# Patient Record
Sex: FEMALE | Race: BLACK OR AFRICAN AMERICAN | NOT HISPANIC OR LATINO | URBAN - METROPOLITAN AREA
[De-identification: names, ages, dates, MRNs, and addresses within clinical notes are randomized per-mention and may not be internally consistent; named-entity substitution may affect disease eponyms.]

---

## 2018-03-25 ENCOUNTER — EMERGENCY (EMERGENCY)
Facility: HOSPITAL | Age: 73
LOS: 1 days | Discharge: HOME | End: 2018-03-25
Attending: EMERGENCY MEDICINE | Admitting: EMERGENCY MEDICINE
Payer: MEDICARE

## 2018-03-25 VITALS
SYSTOLIC BLOOD PRESSURE: 153 MMHG | HEART RATE: 87 BPM | OXYGEN SATURATION: 100 % | RESPIRATION RATE: 20 BRPM | TEMPERATURE: 98 F | DIASTOLIC BLOOD PRESSURE: 66 MMHG

## 2018-03-25 VITALS
HEART RATE: 74 BPM | RESPIRATION RATE: 18 BRPM | OXYGEN SATURATION: 100 % | SYSTOLIC BLOOD PRESSURE: 142 MMHG | DIASTOLIC BLOOD PRESSURE: 83 MMHG

## 2018-03-25 LAB
ALBUMIN SERPL ELPH-MCNC: 4.2 G/DL — SIGNIFICANT CHANGE UP (ref 3.3–5)
ALP SERPL-CCNC: 85 U/L — SIGNIFICANT CHANGE UP (ref 40–120)
ALT FLD-CCNC: 14 U/L — SIGNIFICANT CHANGE UP (ref 4–33)
APPEARANCE UR: CLEAR — SIGNIFICANT CHANGE UP
AST SERPL-CCNC: 23 U/L — SIGNIFICANT CHANGE UP (ref 4–32)
BASOPHILS # BLD AUTO: 0.03 K/UL — SIGNIFICANT CHANGE UP (ref 0–0.2)
BASOPHILS NFR BLD AUTO: 0.6 % — SIGNIFICANT CHANGE UP (ref 0–2)
BILIRUB SERPL-MCNC: 0.9 MG/DL — SIGNIFICANT CHANGE UP (ref 0.2–1.2)
BILIRUB UR-MCNC: NEGATIVE — SIGNIFICANT CHANGE UP
BLOOD UR QL VISUAL: NEGATIVE — SIGNIFICANT CHANGE UP
BUN SERPL-MCNC: 11 MG/DL — SIGNIFICANT CHANGE UP (ref 7–23)
CALCIUM SERPL-MCNC: 9.4 MG/DL — SIGNIFICANT CHANGE UP (ref 8.4–10.5)
CHLORIDE SERPL-SCNC: 92 MMOL/L — LOW (ref 98–107)
CO2 SERPL-SCNC: 25 MMOL/L — SIGNIFICANT CHANGE UP (ref 22–31)
COLOR SPEC: YELLOW — SIGNIFICANT CHANGE UP
CREAT SERPL-MCNC: 0.74 MG/DL — SIGNIFICANT CHANGE UP (ref 0.5–1.3)
CRP SERPL-MCNC: 6.2 MG/L — HIGH
EOSINOPHIL # BLD AUTO: 0.01 K/UL — SIGNIFICANT CHANGE UP (ref 0–0.5)
EOSINOPHIL NFR BLD AUTO: 0.2 % — SIGNIFICANT CHANGE UP (ref 0–6)
GLUCOSE SERPL-MCNC: 114 MG/DL — HIGH (ref 70–99)
GLUCOSE UR-MCNC: NEGATIVE — SIGNIFICANT CHANGE UP
HCT VFR BLD CALC: 39 % — SIGNIFICANT CHANGE UP (ref 34.5–45)
HGB BLD-MCNC: 13.1 G/DL — SIGNIFICANT CHANGE UP (ref 11.5–15.5)
IMM GRANULOCYTES # BLD AUTO: 0.02 # — SIGNIFICANT CHANGE UP
IMM GRANULOCYTES NFR BLD AUTO: 0.4 % — SIGNIFICANT CHANGE UP (ref 0–1.5)
KETONES UR-MCNC: NEGATIVE — SIGNIFICANT CHANGE UP
LEUKOCYTE ESTERASE UR-ACNC: NEGATIVE — SIGNIFICANT CHANGE UP
LYMPHOCYTES # BLD AUTO: 1.49 K/UL — SIGNIFICANT CHANGE UP (ref 1–3.3)
LYMPHOCYTES # BLD AUTO: 27.3 % — SIGNIFICANT CHANGE UP (ref 13–44)
MCHC RBC-ENTMCNC: 30 PG — SIGNIFICANT CHANGE UP (ref 27–34)
MCHC RBC-ENTMCNC: 33.6 % — SIGNIFICANT CHANGE UP (ref 32–36)
MCV RBC AUTO: 89.2 FL — SIGNIFICANT CHANGE UP (ref 80–100)
MONOCYTES # BLD AUTO: 0.74 K/UL — SIGNIFICANT CHANGE UP (ref 0–0.9)
MONOCYTES NFR BLD AUTO: 13.6 % — SIGNIFICANT CHANGE UP (ref 2–14)
MUCOUS THREADS # UR AUTO: SIGNIFICANT CHANGE UP
NEUTROPHILS # BLD AUTO: 3.16 K/UL — SIGNIFICANT CHANGE UP (ref 1.8–7.4)
NEUTROPHILS NFR BLD AUTO: 57.9 % — SIGNIFICANT CHANGE UP (ref 43–77)
NITRITE UR-MCNC: NEGATIVE — SIGNIFICANT CHANGE UP
NRBC # FLD: 0 — SIGNIFICANT CHANGE UP
PH UR: 7 — SIGNIFICANT CHANGE UP (ref 4.6–8)
PLATELET # BLD AUTO: 243 K/UL — SIGNIFICANT CHANGE UP (ref 150–400)
PMV BLD: 9.5 FL — SIGNIFICANT CHANGE UP (ref 7–13)
POTASSIUM SERPL-MCNC: 4.1 MMOL/L — SIGNIFICANT CHANGE UP (ref 3.5–5.3)
POTASSIUM SERPL-SCNC: 4.1 MMOL/L — SIGNIFICANT CHANGE UP (ref 3.5–5.3)
PROT SERPL-MCNC: 8.1 G/DL — SIGNIFICANT CHANGE UP (ref 6–8.3)
PROT UR-MCNC: 20 MG/DL — SIGNIFICANT CHANGE UP
RBC # BLD: 4.37 M/UL — SIGNIFICANT CHANGE UP (ref 3.8–5.2)
RBC # FLD: 12.8 % — SIGNIFICANT CHANGE UP (ref 10.3–14.5)
RBC CASTS # UR COMP ASSIST: SIGNIFICANT CHANGE UP (ref 0–?)
SODIUM SERPL-SCNC: 131 MMOL/L — LOW (ref 135–145)
SP GR SPEC: 1.01 — SIGNIFICANT CHANGE UP (ref 1–1.04)
SQUAMOUS # UR AUTO: SIGNIFICANT CHANGE UP
TSH SERPL-MCNC: 1.63 UIU/ML — SIGNIFICANT CHANGE UP (ref 0.27–4.2)
UROBILINOGEN FLD QL: NORMAL MG/DL — SIGNIFICANT CHANGE UP
WBC # BLD: 5.45 K/UL — SIGNIFICANT CHANGE UP (ref 3.8–10.5)
WBC # FLD AUTO: 5.45 K/UL — SIGNIFICANT CHANGE UP (ref 3.8–10.5)
WBC UR QL: SIGNIFICANT CHANGE UP (ref 0–?)

## 2018-03-25 PROCEDURE — 71046 X-RAY EXAM CHEST 2 VIEWS: CPT | Mod: 26

## 2018-03-25 PROCEDURE — 99284 EMERGENCY DEPT VISIT MOD MDM: CPT | Mod: GC

## 2018-03-25 NOTE — ED ADULT NURSE NOTE - OBJECTIVE STATEMENT
pt A&Ox3 c/o feeling faint and hot inside that began last week, pt states she was recently placed on Lopressor two weeks ago, denies n/v/cp, sob, respirations equal and non labored, nsr on monitor, MD Mercado at bedside

## 2018-03-25 NOTE — ED PROVIDER NOTE - PROGRESS NOTE DETAILS
Yg Mercado MD PGY4: Labs, imaging reviewed. Will discharge with instructions for outpatient workup and follow-up.

## 2018-03-25 NOTE — ED PROVIDER NOTE - CARE PLAN
Principal Discharge DX:	Fatigue  Assessment and plan of treatment:	Call your primary care doctor today or tomorrow to schedule follow up appointment for within the next 3-5 days.  Continue taking your medications as prescribed.  Return to this Emergency Department if you experience worsening condition or for any other emergency.

## 2018-03-25 NOTE — ED PROVIDER NOTE - MEDICAL DECISION MAKING DETAILS
74 yo woman here w/ ?fever, feeling unwell for >3 wks. Non localizing history, non localizing examination. Unknown etiology Will check labs, cxr, ua, ekg.

## 2018-03-25 NOTE — ED PROVIDER NOTE - OBJECTIVE STATEMENT
72 yo woman w/ h/o htn p/w feeling ill. States for the past 3 wks she has been feeling unwell. States at night she has been feeling very hot, nearly every night, though did not take her temperature. Has seen PMD for these complaints, unclear workup, but has completed a course of azithromycin last week. Today came to ED for continued complaints. Denies vomiting, diarrhea, lightheadedness/near syncope/syncope, chest pain, sob, cough, urinary symptoms.  PMD: Patrick

## 2024-06-05 ENCOUNTER — INPATIENT (INPATIENT)
Facility: HOSPITAL | Age: 79
LOS: 0 days | Discharge: HOME | End: 2024-06-06
Attending: INTERNAL MEDICINE | Admitting: INTERNAL MEDICINE
Payer: MEDICARE

## 2024-06-05 VITALS
RESPIRATION RATE: 12 BRPM | SYSTOLIC BLOOD PRESSURE: 169 MMHG | DIASTOLIC BLOOD PRESSURE: 59 MMHG | TEMPERATURE: 98 F | OXYGEN SATURATION: 99 %

## 2024-06-05 DIAGNOSIS — I25.10 ATHEROSCLEROTIC HEART DISEASE OF NATIVE CORONARY ARTERY WITHOUT ANGINA PECTORIS: ICD-10-CM

## 2024-06-05 LAB
ANION GAP SERPL CALC-SCNC: 12 MMOL/L — SIGNIFICANT CHANGE UP (ref 7–14)
BUN SERPL-MCNC: 14 MG/DL — SIGNIFICANT CHANGE UP (ref 7–23)
CALCIUM SERPL-MCNC: 9.7 MG/DL — SIGNIFICANT CHANGE UP (ref 8.4–10.5)
CHLORIDE SERPL-SCNC: 100 MMOL/L — SIGNIFICANT CHANGE UP (ref 98–107)
CO2 SERPL-SCNC: 22 MMOL/L — SIGNIFICANT CHANGE UP (ref 22–31)
CREAT SERPL-MCNC: 0.68 MG/DL — SIGNIFICANT CHANGE UP (ref 0.5–1.3)
EGFR: 88 ML/MIN/1.73M2 — SIGNIFICANT CHANGE UP
GLUCOSE SERPL-MCNC: 105 MG/DL — HIGH (ref 70–99)
HCT VFR BLD CALC: 35.8 % — SIGNIFICANT CHANGE UP (ref 34.5–45)
HGB BLD-MCNC: 12.1 G/DL — SIGNIFICANT CHANGE UP (ref 11.5–15.5)
MCHC RBC-ENTMCNC: 30.9 PG — SIGNIFICANT CHANGE UP (ref 27–34)
MCHC RBC-ENTMCNC: 33.8 GM/DL — SIGNIFICANT CHANGE UP (ref 32–36)
MCV RBC AUTO: 91.3 FL — SIGNIFICANT CHANGE UP (ref 80–100)
NRBC # BLD: 0 /100 WBCS — SIGNIFICANT CHANGE UP (ref 0–0)
NRBC # FLD: 0.03 K/UL — HIGH (ref 0–0)
PLATELET # BLD AUTO: 145 K/UL — LOW (ref 150–400)
POTASSIUM SERPL-MCNC: 5.1 MMOL/L — SIGNIFICANT CHANGE UP (ref 3.5–5.3)
POTASSIUM SERPL-SCNC: 5.1 MMOL/L — SIGNIFICANT CHANGE UP (ref 3.5–5.3)
RBC # BLD: 3.92 M/UL — SIGNIFICANT CHANGE UP (ref 3.8–5.2)
RBC # FLD: 13.3 % — SIGNIFICANT CHANGE UP (ref 10.3–14.5)
SODIUM SERPL-SCNC: 134 MMOL/L — LOW (ref 135–145)
WBC # BLD: 4.67 K/UL — SIGNIFICANT CHANGE UP (ref 3.8–10.5)
WBC # FLD AUTO: 4.67 K/UL — SIGNIFICANT CHANGE UP (ref 3.8–10.5)

## 2024-06-05 PROCEDURE — 99152 MOD SED SAME PHYS/QHP 5/>YRS: CPT

## 2024-06-05 PROCEDURE — 93458 L HRT ARTERY/VENTRICLE ANGIO: CPT | Mod: 26,59

## 2024-06-05 PROCEDURE — 93010 ELECTROCARDIOGRAM REPORT: CPT

## 2024-06-05 PROCEDURE — 92928 PRQ TCAT PLMT NTRAC ST 1 LES: CPT | Mod: 26,RC

## 2024-06-05 PROCEDURE — 92978 ENDOLUMINL IVUS OCT C 1ST: CPT | Mod: 26,RC

## 2024-06-05 RX ORDER — SODIUM CHLORIDE 9 MG/ML
250 INJECTION INTRAMUSCULAR; INTRAVENOUS; SUBCUTANEOUS ONCE
Refills: 0 | Status: COMPLETED | OUTPATIENT
Start: 2024-06-05 | End: 2024-06-05

## 2024-06-05 RX ORDER — ASPIRIN/CALCIUM CARB/MAGNESIUM 324 MG
81 TABLET ORAL DAILY
Refills: 0 | Status: DISCONTINUED | OUTPATIENT
Start: 2024-06-06 | End: 2024-06-06

## 2024-06-05 RX ORDER — SODIUM CHLORIDE 9 MG/ML
500 INJECTION INTRAMUSCULAR; INTRAVENOUS; SUBCUTANEOUS
Refills: 0 | Status: DISCONTINUED | OUTPATIENT
Start: 2024-06-05 | End: 2024-06-06

## 2024-06-05 RX ORDER — PANTOPRAZOLE SODIUM 20 MG/1
40 TABLET, DELAYED RELEASE ORAL
Refills: 0 | Status: DISCONTINUED | OUTPATIENT
Start: 2024-06-06 | End: 2024-06-06

## 2024-06-05 RX ORDER — METOPROLOL TARTRATE 50 MG
1 TABLET ORAL
Refills: 0 | DISCHARGE

## 2024-06-05 RX ORDER — CLOPIDOGREL BISULFATE 75 MG/1
75 TABLET, FILM COATED ORAL DAILY
Refills: 0 | Status: DISCONTINUED | OUTPATIENT
Start: 2024-06-06 | End: 2024-06-06

## 2024-06-05 RX ORDER — SODIUM CHLORIDE 9 MG/ML
3 INJECTION INTRAMUSCULAR; INTRAVENOUS; SUBCUTANEOUS EVERY 8 HOURS
Refills: 0 | Status: DISCONTINUED | OUTPATIENT
Start: 2024-06-05 | End: 2024-06-06

## 2024-06-05 RX ORDER — HEPARIN SODIUM 5000 [USP'U]/ML
5000 INJECTION INTRAVENOUS; SUBCUTANEOUS EVERY 12 HOURS
Refills: 0 | Status: DISCONTINUED | OUTPATIENT
Start: 2024-06-06 | End: 2024-06-06

## 2024-06-05 RX ORDER — METOPROLOL TARTRATE 50 MG
50 TABLET ORAL DAILY
Refills: 0 | Status: DISCONTINUED | OUTPATIENT
Start: 2024-06-05 | End: 2024-06-06

## 2024-06-05 RX ORDER — ATORVASTATIN CALCIUM 80 MG/1
80 TABLET, FILM COATED ORAL AT BEDTIME
Refills: 0 | Status: DISCONTINUED | OUTPATIENT
Start: 2024-06-06 | End: 2024-06-06

## 2024-06-05 RX ORDER — ESOMEPRAZOLE MAGNESIUM 40 MG/1
1 CAPSULE, DELAYED RELEASE ORAL
Refills: 0 | DISCHARGE

## 2024-06-05 RX ORDER — SODIUM CHLORIDE 9 MG/ML
1000 INJECTION INTRAMUSCULAR; INTRAVENOUS; SUBCUTANEOUS
Refills: 0 | Status: DISCONTINUED | OUTPATIENT
Start: 2024-06-05 | End: 2024-06-05

## 2024-06-05 RX ADMIN — SODIUM CHLORIDE 3 MILLILITER(S): 9 INJECTION INTRAMUSCULAR; INTRAVENOUS; SUBCUTANEOUS at 21:50

## 2024-06-05 RX ADMIN — SODIUM CHLORIDE 75 MILLILITER(S): 9 INJECTION INTRAMUSCULAR; INTRAVENOUS; SUBCUTANEOUS at 23:01

## 2024-06-05 RX ADMIN — SODIUM CHLORIDE 75 MILLILITER(S): 9 INJECTION INTRAMUSCULAR; INTRAVENOUS; SUBCUTANEOUS at 13:07

## 2024-06-05 RX ADMIN — SODIUM CHLORIDE 75 MILLILITER(S): 9 INJECTION INTRAMUSCULAR; INTRAVENOUS; SUBCUTANEOUS at 15:55

## 2024-06-05 RX ADMIN — SODIUM CHLORIDE 1000 MILLILITER(S): 9 INJECTION INTRAMUSCULAR; INTRAVENOUS; SUBCUTANEOUS at 13:07

## 2024-06-05 NOTE — CHART NOTE - NSCHARTNOTEFT_GEN_A_CORE
79yFemale s/p cardiac cath today for right radial site check. Pt without any complaints at this time.    Right radial site: Dressing in place c/d/i. No hematoma. No swelling/edema/discoloration/coldness of extremity. Pulses and sensation intact. Cap refill <3 secs.     Will cont to monitor.

## 2024-06-05 NOTE — H&P CARDIOLOGY - NSICDXPASTMEDICALHX_GEN_ALL_CORE_FT
PAST MEDICAL HISTORY:  HLD (hyperlipidemia)      PAST MEDICAL HISTORY:  GERD (gastroesophageal reflux disease)     HLD (hyperlipidemia)

## 2024-06-05 NOTE — ASU PATIENT PROFILE, ADULT - HISTORY OF COVID-19 VACCINATION
----- Message from Chelsea Dennis MD sent at 9/9/2022  2:32 PM EDT -----  Regarding: Please inform patient of acceptable stress test result  Please notify the patient that their stress test was acceptable and reassuring  It did show very high blood pressure so please remind her to follow up as we discussed per my last note  No other cardiac workup now  Thanks!    - Israel Jaramillo    ________________    ·  Stress ECG: The patient reached stage 2 0 of the protocol after exercising for 4 min and 52 sec and had a maximal HR of 144 bpm (92 % of MPHR) and 6 8 METS  The test was stopped because of hypertension  Blood pressure demonstrated a hypertensive response and heart rate demonstrated a normal response to stress  ·  Stress ECG: Up sloping ST depression of 1 0 mm in the inferior leads is noted  ST depression began during stress and ended during recovery  There were no arrhythmias during stress  The stress ECG is equivocal for ischemia after maximal exercise, without reproduction of symptoms  Hypertensive blood pressure response  No clinical angina reported    ·  Knight Treadmill Score =0 (Moderate Risk)
Called and advised 
Yes

## 2024-06-05 NOTE — H&P CARDIOLOGY - COMMENTS
Pre Procedural Sedation Evaluation    Urine pregnancy: N/A  Dentures: None  Last PO intake: NPO p MN on 6/4/2024  Obstructive sleep apnea: No  Aspiration risk: No  Mallampati score: 2  ASA Classification: 2  Prior Sedative or Anesthesia Experience: No complications  Informed consent by responsible adult: Yes  Responsible adult escort: Yes  Based on today's assessment, anesthesia consult requested: No

## 2024-06-05 NOTE — H&P CARDIOLOGY - HISTORY OF PRESENT ILLNESS
80 y/o F w/ PMH of HLD and Pre-DM2 presents for cardiac catheretization.    Stress test from 4/24/2024 was abnormal with showing a large moderate-severe infero basal-apical partially reversible ischemia, inferior + basal inferior hypokinesis and normal LVEF of 66%. Echocardiogram from 3/12/2024 was also abnormal with basal inferolateral + mid inferolateral hypokinesis, normal LVEF of 67% and moderate MR.    Refferring MD: Dr. Aparicio 78 y/o F w/ PMH of HLD, GERD and Pre-DM2 presents for cardiac catheretization. Pt complains of exertional shortness breath which has been occurring for the past several months. Pt first noticed the PAIZ when chasing after a bus. Stress test from 4/24/2024 was abnormal with showing a large moderate-severe infero basal-apical partially reversible ischemia, inferior + basal inferior hypokinesis and normal LVEF of 66%. Echocardiogram from 3/12/2024 was also abnormal with basal inferolateral + mid inferolateral hypokinesis, normal LVEF of 67% and moderate MR. Pt also has a family Hx of CAD with her sister having 2 stents.    Refferring MD: Dr. Aparicoi

## 2024-06-06 VITALS
OXYGEN SATURATION: 99 % | DIASTOLIC BLOOD PRESSURE: 76 MMHG | SYSTOLIC BLOOD PRESSURE: 129 MMHG | TEMPERATURE: 98 F | HEART RATE: 75 BPM | RESPIRATION RATE: 17 BRPM

## 2024-06-06 LAB
ANION GAP SERPL CALC-SCNC: 12 MMOL/L — SIGNIFICANT CHANGE UP (ref 7–14)
BUN SERPL-MCNC: 14 MG/DL — SIGNIFICANT CHANGE UP (ref 7–23)
CALCIUM SERPL-MCNC: 9.5 MG/DL — SIGNIFICANT CHANGE UP (ref 8.4–10.5)
CHLORIDE SERPL-SCNC: 103 MMOL/L — SIGNIFICANT CHANGE UP (ref 98–107)
CO2 SERPL-SCNC: 22 MMOL/L — SIGNIFICANT CHANGE UP (ref 22–31)
CREAT SERPL-MCNC: 0.69 MG/DL — SIGNIFICANT CHANGE UP (ref 0.5–1.3)
EGFR: 88 ML/MIN/1.73M2 — SIGNIFICANT CHANGE UP
GLUCOSE SERPL-MCNC: 94 MG/DL — SIGNIFICANT CHANGE UP (ref 70–99)
HCT VFR BLD CALC: 36.1 % — SIGNIFICANT CHANGE UP (ref 34.5–45)
HGB BLD-MCNC: 11.9 G/DL — SIGNIFICANT CHANGE UP (ref 11.5–15.5)
MAGNESIUM SERPL-MCNC: 1.9 MG/DL — SIGNIFICANT CHANGE UP (ref 1.6–2.6)
MCHC RBC-ENTMCNC: 30.2 PG — SIGNIFICANT CHANGE UP (ref 27–34)
MCHC RBC-ENTMCNC: 33 GM/DL — SIGNIFICANT CHANGE UP (ref 32–36)
MCV RBC AUTO: 91.6 FL — SIGNIFICANT CHANGE UP (ref 80–100)
NRBC # BLD: 0 /100 WBCS — SIGNIFICANT CHANGE UP (ref 0–0)
NRBC # FLD: 0 K/UL — SIGNIFICANT CHANGE UP (ref 0–0)
PHOSPHATE SERPL-MCNC: 2.9 MG/DL — SIGNIFICANT CHANGE UP (ref 2.5–4.5)
PLATELET # BLD AUTO: 171 K/UL — SIGNIFICANT CHANGE UP (ref 150–400)
POTASSIUM SERPL-MCNC: 3.8 MMOL/L — SIGNIFICANT CHANGE UP (ref 3.5–5.3)
POTASSIUM SERPL-SCNC: 3.8 MMOL/L — SIGNIFICANT CHANGE UP (ref 3.5–5.3)
RBC # BLD: 3.94 M/UL — SIGNIFICANT CHANGE UP (ref 3.8–5.2)
RBC # FLD: 13.2 % — SIGNIFICANT CHANGE UP (ref 10.3–14.5)
SODIUM SERPL-SCNC: 137 MMOL/L — SIGNIFICANT CHANGE UP (ref 135–145)
WBC # BLD: 4.54 K/UL — SIGNIFICANT CHANGE UP (ref 3.8–10.5)
WBC # FLD AUTO: 4.54 K/UL — SIGNIFICANT CHANGE UP (ref 3.8–10.5)

## 2024-06-06 PROCEDURE — 99232 SBSQ HOSP IP/OBS MODERATE 35: CPT

## 2024-06-06 RX ORDER — CLOPIDOGREL BISULFATE 75 MG/1
1 TABLET, FILM COATED ORAL
Qty: 30 | Refills: 2
Start: 2024-06-06 | End: 2024-09-03

## 2024-06-06 RX ORDER — ROSUVASTATIN CALCIUM 5 MG/1
1 TABLET ORAL
Qty: 30 | Refills: 0
Start: 2024-06-06 | End: 2024-07-05

## 2024-06-06 RX ORDER — PITAVASTATIN CALCIUM 1.04 MG/1
1 TABLET, FILM COATED ORAL
Qty: 30 | Refills: 0
Start: 2024-06-06 | End: 2024-07-05

## 2024-06-06 RX ORDER — ASPIRIN/CALCIUM CARB/MAGNESIUM 324 MG
1 TABLET ORAL
Qty: 30 | Refills: 2
Start: 2024-06-06 | End: 2024-09-03

## 2024-06-06 RX ADMIN — HEPARIN SODIUM 5000 UNIT(S): 5000 INJECTION INTRAVENOUS; SUBCUTANEOUS at 05:29

## 2024-06-06 RX ADMIN — Medication 81 MILLIGRAM(S): at 12:04

## 2024-06-06 RX ADMIN — Medication 50 MILLIGRAM(S): at 05:28

## 2024-06-06 RX ADMIN — CLOPIDOGREL BISULFATE 75 MILLIGRAM(S): 75 TABLET, FILM COATED ORAL at 12:04

## 2024-06-06 RX ADMIN — SODIUM CHLORIDE 3 MILLILITER(S): 9 INJECTION INTRAMUSCULAR; INTRAVENOUS; SUBCUTANEOUS at 06:30

## 2024-06-06 RX ADMIN — PANTOPRAZOLE SODIUM 40 MILLIGRAM(S): 20 TABLET, DELAYED RELEASE ORAL at 05:28

## 2024-06-06 NOTE — DISCHARGE NOTE PROVIDER - PROVIDER TOKENS
PROVIDER:[TOKEN:[[id number]],FOLLOWUP:[1 week]],FREE:[LAST:[Your],FIRST:[PCP],PHONE:[(   )    -],FAX:[(   )    -],FOLLOWUP:[1 week]]

## 2024-06-06 NOTE — DISCHARGE NOTE NURSING/CASE MANAGEMENT/SOCIAL WORK - PATIENT PORTAL LINK FT
You can access the FollowMyHealth Patient Portal offered by [facility] by registering at the following website: [email or web address]. By joining [employer]’s FollowMyHealth portal, you will also be able to view your health information using other applications (apps) compatible with our system.

## 2024-06-06 NOTE — PROGRESS NOTE ADULT - SUBJECTIVE AND OBJECTIVE BOX
Incomplete    Patient seen and examined at bedside.    Overnight Events:       REVIEW OF SYSTEMS:  Constitutional:     [ ] negative [ ] fevers [ ] chills [ ] weight loss [ ] weight gain  HEENT:                  [ ] negative [ ] dry eyes [ ] eye irritation [ ] postnasal drip [ ] nasal congestion  CV:                         [ ] negative  [ ] chest pain [ ] orthopnea [ ] palpitations [ ] murmur  Resp:                     [ ] negative [ ] cough [ ] shortness of breath [ ] dyspnea [ ] wheezing [ ] sputum [ ]hemoptysis  GI:                          [ ] negative [ ] nausea [ ] vomiting [ ] diarrhea [ ] constipation [ ] abd pain [ ] dysphagia   :                        [ ] negative [ ] dysuria [ ] nocturia [ ] hematuria [ ] increased urinary frequency  Musculoskeletal: [ ] negative [ ] back pain [ ] myalgias [ ] arthralgias [ ] fracture  Skin:                       [ ] negative [ ] rash [ ] itch  Neurological:        [ ] negative [ ] headache [ ] dizziness [ ] syncope [ ] weakness [ ] numbness  Psychiatric:           [ ] negative [ ] anxiety [ ] depression  Endocrine:            [ ] negative [ ] diabetes [ ] thyroid problem  Heme/Lymph:      [ ] negative [ ] anemia [ ] bleeding problem  Allergic/Immune: [ ] negative [ ] itchy eyes [ ] nasal discharge [ ] hives [ ] angioedema    [ ] All other systems negative  [ ] Unable to assess ROS due to    Current Meds:  aspirin enteric coated 81 milliGRAM(s) Oral daily  atorvastatin 80 milliGRAM(s) Oral at bedtime  clopidogrel Tablet 75 milliGRAM(s) Oral daily  heparin   Injectable 5000 Unit(s) SubCutaneous every 12 hours  metoprolol succinate ER 50 milliGRAM(s) Oral daily  pantoprazole    Tablet 40 milliGRAM(s) Oral before breakfast  sodium chloride 0.9% lock flush 3 milliLiter(s) IV Push every 8 hours  sodium chloride 0.9%. 500 milliLiter(s) IV Continuous <Continuous>  sodium chloride 0.9%. 500 milliLiter(s) IV Continuous <Continuous>      PAST MEDICAL & SURGICAL HISTORY:  HLD (hyperlipidemia)      GERD (gastroesophageal reflux disease)    Vitals:  T(F): 97.6 (06-06), Max: 98.8 (06-05)  HR: 67 (06-06) (63 - 73)  BP: 146/67 (06-06) (138/53 - 170/67)  RR: 17 (06-06)  SpO2: 98% (06-06)  I&O's Summary      Physical Exam:  Appearance: No acute distress; well appearing  Eyes: PERRL, EOMI, pink conjunctiva  HENT: Normal oral mucosa  Cardiovascular: RRR, S1, S2, no murmurs, rubs, or gallops; no edema; no JVD  Respiratory: Clear to auscultation bilaterally  Gastrointestinal: soft, non-tender, non-distended with normal bowel sounds  Musculoskeletal: No clubbing; no joint deformity   Neurologic: Non-focal  Lymphatic: No lymphadenopathy  Psychiatry: AAOx3, mood & affect appropriate  Skin: No rashes, ecchymoses, or cyanosis                          11.9   4.54  )-----------( 171      ( 06 Jun 2024 07:00 )             36.1     06-06    137  |  103  |  14  ----------------------------<  94  3.8   |  22  |  0.69    Ca    9.5      06 Jun 2024 07:00  Phos  2.9     06-06  Mg     1.90     06-06             Patient seen and examined at bedside. HDS. Pt denies active CP, SOB, palpitations, diaphoresis, orthopnea, PND, dizziness, syncope, hemoptysis, hematuria, melena, abdominal, pain/discomfort, LE swelling or any other complaints at this time.    Overnight Events: none      REVIEW OF SYSTEMS:  Constitutional:     [ ] negative [ ] fevers [ ] chills [ ] weight loss [ ] weight gain  HEENT:                  [ ] negative [ ] dry eyes [ ] eye irritation [ ] postnasal drip [ ] nasal congestion  CV:                         [ ] negative  [ ] chest pain [ ] orthopnea [ ] palpitations [ ] murmur  Resp:                     [ ] negative [ ] cough [ ] shortness of breath [ ] dyspnea [ ] wheezing [ ] sputum [ ]hemoptysis  GI:                          [ ] negative [ ] nausea [ ] vomiting [ ] diarrhea [ ] constipation [ ] abd pain [ ] dysphagia   :                        [ ] negative [ ] dysuria [ ] nocturia [ ] hematuria [ ] increased urinary frequency  Musculoskeletal: [ ] negative [ ] back pain [ ] myalgias [ ] arthralgias [ ] fracture  Skin:                       [ ] negative [ ] rash [ ] itch  Neurological:        [ ] negative [ ] headache [ ] dizziness [ ] syncope [ ] weakness [ ] numbness  Psychiatric:           [ ] negative [ ] anxiety [ ] depression  Endocrine:            [ ] negative [ ] diabetes [ ] thyroid problem  Heme/Lymph:      [ ] negative [ ] anemia [ ] bleeding problem  Allergic/Immune: [ ] negative [ ] itchy eyes [ ] nasal discharge [ ] hives [ ] angioedema    [x] All other systems negative  [ ] Unable to assess ROS due to    Current Meds:  aspirin enteric coated 81 milliGRAM(s) Oral daily  atorvastatin 80 milliGRAM(s) Oral at bedtime  clopidogrel Tablet 75 milliGRAM(s) Oral daily  heparin   Injectable 5000 Unit(s) SubCutaneous every 12 hours  metoprolol succinate ER 50 milliGRAM(s) Oral daily  pantoprazole    Tablet 40 milliGRAM(s) Oral before breakfast  sodium chloride 0.9% lock flush 3 milliLiter(s) IV Push every 8 hours  sodium chloride 0.9%. 500 milliLiter(s) IV Continuous <Continuous>  sodium chloride 0.9%. 500 milliLiter(s) IV Continuous <Continuous>      PAST MEDICAL & SURGICAL HISTORY:  HLD (hyperlipidemia)      GERD (gastroesophageal reflux disease)    Vitals:  T(F): 97.6 (06-06), Max: 98.8 (06-05)  HR: 67 (06-06) (63 - 73)  BP: 146/67 (06-06) (138/53 - 170/67)  RR: 17 (06-06)  SpO2: 98% (06-06)  I&O's Summary    Physical Exam:  Appearance: No acute distress; well appearing  Neck: Supple, no JVD B/L, no Carotid Bruit B/L  Cardiovascular: RRR, S1, S2, no murmurs, rubs, or gallops, no edema  Respiratory: Clear to auscultation bilaterally, no RRW B/L  Gastrointestinal: soft, non-tender, non-distended with normal bowel sounds  Neurologic: No focal or neuro deficits noted  Psychiatry: AAOx3, mood & affect appropriate  Access Site: RRA. Stable, C/D/I, w/o hematoma or bleeding, pulses intact, back to baseline                        11.9   4.54  )-----------( 171      ( 06 Jun 2024 07:00 )             36.1     06-06    137  |  103  |  14  ----------------------------<  94  3.8   |  22  |  0.69    Ca    9.5      06 Jun 2024 07:00  Phos  2.9     06-06  Mg     1.90     06-06             Patient seen and examined at bedside. HDS. Pt denies active CP, SOB, palpitations, diaphoresis, orthopnea, PND, dizziness, syncope, hemoptysis, hematuria, melena, abdominal, pain/discomfort, LE swelling or any other complaints at this time.    Overnight Events: none      REVIEW OF SYSTEMS:  Constitutional:     [ ] negative [ ] fevers [ ] chills [ ] weight loss [ ] weight gain  HEENT:                  [ ] negative [ ] dry eyes [ ] eye irritation [ ] postnasal drip [ ] nasal congestion  CV:                         [ ] negative  [ ] chest pain [ ] orthopnea [ ] palpitations [ ] murmur  Resp:                     [ ] negative [ ] cough [ ] shortness of breath [ ] dyspnea [ ] wheezing [ ] sputum [ ]hemoptysis  GI:                          [ ] negative [ ] nausea [ ] vomiting [ ] diarrhea [ ] constipation [ ] abd pain [ ] dysphagia   :                        [ ] negative [ ] dysuria [ ] nocturia [ ] hematuria [ ] increased urinary frequency  Musculoskeletal: [ ] negative [ ] back pain [ ] myalgias [ ] arthralgias [ ] fracture  Skin:                       [ ] negative [ ] rash [ ] itch  Neurological:        [ ] negative [ ] headache [ ] dizziness [ ] syncope [ ] weakness [ ] numbness  Psychiatric:           [ ] negative [ ] anxiety [ ] depression  Endocrine:            [ ] negative [ ] diabetes [ ] thyroid problem  Heme/Lymph:      [ ] negative [ ] anemia [ ] bleeding problem  Allergic/Immune: [ ] negative [ ] itchy eyes [ ] nasal discharge [ ] hives [ ] angioedema    [x] All other systems negative  [ ] Unable to assess ROS due to    Current Meds:  aspirin enteric coated 81 milliGRAM(s) Oral daily  atorvastatin 80 milliGRAM(s) Oral at bedtime  clopidogrel Tablet 75 milliGRAM(s) Oral daily  heparin   Injectable 5000 Unit(s) SubCutaneous every 12 hours  metoprolol succinate ER 50 milliGRAM(s) Oral daily  pantoprazole    Tablet 40 milliGRAM(s) Oral before breakfast  sodium chloride 0.9% lock flush 3 milliLiter(s) IV Push every 8 hours  sodium chloride 0.9%. 500 milliLiter(s) IV Continuous <Continuous>  sodium chloride 0.9%. 500 milliLiter(s) IV Continuous <Continuous>      PAST MEDICAL & SURGICAL HISTORY:  HLD (hyperlipidemia)      GERD (gastroesophageal reflux disease)    Vitals:  T(F): 97.6 (06-06), Max: 98.8 (06-05)  HR: 67 (06-06) (63 - 73)  BP: 146/67 (06-06) (138/53 - 170/67)  RR: 17 (06-06)  SpO2: 98% (06-06)  I&O's Summary    Physical Exam:  Appearance: No acute distress; well appearing  Neck: Supple, no JVD B/L, no Carotid Bruit B/L  Cardiovascular: RRR, S1, S2, no murmurs, rubs, or gallops, no edema  Respiratory: Clear to auscultation bilaterally, no RRW B/L  Gastrointestinal: soft, non-tender, non-distended with normal bowel sounds  Neurologic: No focal or neuro deficits noted  Psychiatry: AAOx3, mood & affect appropriate  Access Site: RRA. Stable, C/D/I, w/o hematoma or bleeding, pulses intact, back to baseline                        11.9   4.54  )-----------( 171      ( 06 Jun 2024 07:00 )             36.1     06-06    137  |  103  |  14  ----------------------------<  94  3.8   |  22  |  0.69    Ca    9.5      06 Jun 2024 07:00  Phos  2.9     06-06  Mg     1.90     06-06 6/6 Telemetry: NSR

## 2024-06-06 NOTE — DISCHARGE NOTE PROVIDER - HOSPITAL COURSE
78 y/o F, FHX CAD w/ PMH of HLD, GERD and Pre-DM2 presents for cardiac catheretization. Pt complains of exertional shortness breath which has been occurring for the past several months. Pt first noticed the PAIZ when chasing after a bus. Stress test from 4/24/2024 was abnormal with showing a large moderate-severe infero basal-apical partially reversible ischemia, inferior + basal inferior hypokinesis and normal LVEF of 66%. Echocardiogram from 3/12/2024 was also abnormal with basal inferolateral + mid inferolateral hypokinesis, normal LVEF of 67% and moderate MR.  Pt underwent LHC 6/5:  mRCA 99% x1 MICHAEL, LVEDP 25mmHg.     Plan    - continue ASA 81mg daily, Plavix 75mg daily for DAPT for 1 year (please send script for DAPT, 30 day supply with 2 refills)  - started on Atorvastatin 80mg daily to prevent atherosclerotic plaque build up in stent  - continue Metoprolol Succinate 50mg daily  - continue Nexium 20mg daily  - right radial access site stable w/o hematoma or bleed  - EKG non-ischemic  - Magnesium 1.9. Monitor electrolytes and replete K to 4 and Mg to 2   - Case discussed with Dr Cunningham, deemed stable from cardiology standpoint for discharge today. Pt is to follow up with outpatient cardiologist, Dr Aparicio in 1-2 weeks.       On 6/6/24 this case was reviewed with Gumaro BANSAL, the patient is medically stable and optimized for discharge. All medications were reviewed and prescriptions were sent to mutually agreed upon pharmacy. The patient agrees to follow up with providers as recommended.    Patient does not want to take Lipitor because she said her family members have all had side effects such as headaches and muscle pains while on statin. She would prefer to be on Livalo but this is not covered by her insurance and she is unable to pay the $351 out of pocket. Patient is agreeable to try Crestor.

## 2024-06-06 NOTE — DISCHARGE NOTE PROVIDER - NSDCFUADDINST_GEN_ALL_CORE_FT
Activity: Rest today. You may drive in 24 hours. NO strenuous activity, straining, heavy lifting, pushing or pulling for 3 days.    Bandage: Keep bandage clean and dry. Remove after 24 hours.      Bathing: You may shower tomorrow. No baths/sitting in pools for 7 days.    Medications: Take those medicines reviewed today with you on the medication reconciliation sheet. If you had a balloon or stent procedure, DO NOT STOP taking the medicines to keep your stent open without first asking your Cardiologist.    Diet: See instructions above. Drink at least 8 glasses of water today unless you are told otherwise.   Special Instructions: If the procedure was done in your wrist, avoid reaching or placing too much pressure on that arm or wrist for 48 hours. If the procedure was done in your leg ( groin), limit stair climbing for 48 hours.  If you have bleeding from the procedure site: Lie down and remove the bandage. Apply hard pressure and call your doctor. If bleeding and swelling cannot be controlled, call 911.    Call your doctor immediately if: 1) You have severe pain, swelling, or bruising at the procedure site. A small amount or soreness and bruising (black and blue) is normal. 2) Procedure site becomes very red or feels hot to touch. 3) Your hand becomes blue or feels cold to touch. 4)You feel sudden back or belly pain. 5)You develop fever.    Contact information: If you are unable to reach your Doctor, call the cardiology office at [facility] [phone] (Monday-Friday 8am-5pm). After 5pm and on weekends, please call , and ask for "cardiology fellow".

## 2024-06-06 NOTE — PHARMACOTHERAPY INTERVENTION NOTE - COMMENTS
Discharge medications reviewed with the patient. Current medication schedule was discussed in detail including: medication name, indication, dose, administration times, treatment duration, side effects, and special instructions. Reviewed signs and symptoms of bleeding to watch for and when to seek medical attention. Advised to avoid NSAIDs. Patient reports her sisters can't tolerate statins but is unsure of which side effect occurred. Her sister currently takes Livalo and patient requests to be on the same medication. Informed ACP Miranda. Questions and concerns were answered and addressed. Patient demonstrated understanding. Patient provided with educational medication cards.    New medications: aspirin, clopidogrel, atorvastatin    Monik Dawn, PharmD, BCPS  Clinical Pharmacy Specialist  [id number]

## 2024-06-06 NOTE — DISCHARGE NOTE PROVIDER - NSDCMRMEDTOKEN_GEN_ALL_CORE_FT
aspirin 81 mg oral delayed release tablet: 1 tab(s) orally once a day  clopidogrel 75 mg oral tablet: 1 tab(s) orally once a day  Crestor 40 mg oral tablet: 1 tab(s) orally once a day (at bedtime)  NexIUM 20 mg oral delayed release capsule: 1 cap(s) orally once a day as needed for  heartburn  Toprol-XL 50 mg oral tablet, extended release: 1 tab(s) orally once a day

## 2024-06-06 NOTE — DISCHARGE NOTE PROVIDER - NSDCCPTREATMENT_GEN_ALL_CORE_FT
PRINCIPAL PROCEDURE  Procedure: Left heart cardiac cath  Findings and Treatment: LHC: mRCA 99% x1 MICHAEL, LVEDP 25, RRA accessed,

## 2024-06-06 NOTE — DISCHARGE NOTE PROVIDER - CARE PROVIDER_API CALL
Carlos Aparicio  Cardiovascular Disease  [address] [place] [address]  Phone: [phone]  Fax: [phone]  Follow Up Time: 1 week    Your, PCP  Phone: (   )    -  Fax: (   )    -  Follow Up Time: 1 week

## 2024-06-06 NOTE — PROGRESS NOTE ADULT - ASSESSMENT
80 y/o F, FHX CAD w/ PMH of HLD, GERD and Pre-DM2 presents for cardiac catheretization. Pt complains of exertional shortness breath which has been occurring for the past several months. Pt first noticed the PAIZ when chasing after a bus. Stress test from 4/24/2024 was abnormal with showing a large moderate-severe infero basal-apical partially reversible ischemia, inferior + basal inferior hypokinesis and normal LVEF of 66%. Echocardiogram from 3/12/2024 was also abnormal with basal inferolateral + mid inferolateral hypokinesis, normal LVEF of 67% and moderate MR.  Pt underwent LHC 6/5:  mRCA 99% x1 MICHAEL, LVEDP 25mmHg.     Plan    - continue ASA 81mg daily, Plavix 75mg daily for DAPT for 1 year (please send script for DAPT, 30 day supply with 2 refills)  - started on Atorvastatin 80mg daily to prevent atherosclerotic plaque build up in stent  - continue Metoprolol Succinate 50mg daily  - continue Nexium 20mg daily  - right radial access site stable w/o hematoma or bleed  - EKG non-ischemic  - Magnesium 1.9. Monitor electrolytes and replete K to 4 and Mg to 2   - Follow up with outpatient cardiologist, Dr Aparicio in 1-2 weeks.    78 y/o F, FHX CAD w/ PMH of HLD, GERD and Pre-DM2 presents for cardiac catheretization. Pt complains of exertional shortness breath which has been occurring for the past several months. Pt first noticed the PAZI when chasing after a bus. Stress test from 4/24/2024 was abnormal with showing a large moderate-severe infero basal-apical partially reversible ischemia, inferior + basal inferior hypokinesis and normal LVEF of 66%. Echocardiogram from 3/12/2024 was also abnormal with basal inferolateral + mid inferolateral hypokinesis, normal LVEF of 67% and moderate MR.  Pt underwent LHC 6/5:  mRCA 99% x1 MICHAEL, LVEDP 25mmHg.     Plan    - continue ASA 81mg daily, Plavix 75mg daily for DAPT for 1 year (please send script for DAPT, 30 day supply with 2 refills)  - started on Atorvastatin 80mg daily to prevent atherosclerotic plaque build up in stent  - continue Metoprolol Succinate 50mg daily  - continue Nexium 20mg daily  - right radial access site stable w/o hematoma or bleed  - EKG non-ischemic  - Magnesium 1.9. Monitor electrolytes and replete K to 4 and Mg to 2   - Case discussed with Dr Cunningham, deemed stable from cardiology standpoint for discharge today. Pt is to follow up with outpatient cardiologist, Dr Aparicio in 1-2 weeks.
